# Patient Record
Sex: FEMALE | Race: WHITE
[De-identification: names, ages, dates, MRNs, and addresses within clinical notes are randomized per-mention and may not be internally consistent; named-entity substitution may affect disease eponyms.]

---

## 2019-07-31 ENCOUNTER — HOSPITAL ENCOUNTER (EMERGENCY)
Dept: HOSPITAL 74 - JER | Age: 54
Discharge: HOME | End: 2019-07-31
Payer: COMMERCIAL

## 2019-07-31 VITALS — TEMPERATURE: 97.7 F

## 2019-07-31 VITALS — SYSTOLIC BLOOD PRESSURE: 119 MMHG | DIASTOLIC BLOOD PRESSURE: 66 MMHG | HEART RATE: 76 BPM

## 2019-07-31 VITALS — BODY MASS INDEX: 26.6 KG/M2

## 2019-07-31 DIAGNOSIS — F41.9: ICD-10-CM

## 2019-07-31 DIAGNOSIS — R07.89: Primary | ICD-10-CM

## 2019-07-31 LAB
ALBUMIN SERPL-MCNC: 4.1 G/DL (ref 3.4–5)
ALP SERPL-CCNC: 102 U/L (ref 45–117)
ALT SERPL-CCNC: 17 U/L (ref 13–61)
ANION GAP SERPL CALC-SCNC: 8 MMOL/L (ref 8–16)
AST SERPL-CCNC: 12 U/L (ref 15–37)
BASOPHILS # BLD: 0.5 % (ref 0–2)
BILIRUB SERPL-MCNC: 0.9 MG/DL (ref 0.2–1)
BUN SERPL-MCNC: 13.4 MG/DL (ref 7–18)
CALCIUM SERPL-MCNC: 9.5 MG/DL (ref 8.5–10.1)
CHLORIDE SERPL-SCNC: 110 MMOL/L (ref 98–107)
CO2 SERPL-SCNC: 24 MMOL/L (ref 21–32)
CREAT SERPL-MCNC: 0.7 MG/DL (ref 0.55–1.3)
DEPRECATED RDW RBC AUTO: 13.2 % (ref 11.6–15.6)
EOSINOPHIL # BLD: 0.4 % (ref 0–4.5)
GLUCOSE SERPL-MCNC: 87 MG/DL (ref 74–106)
HCT VFR BLD CALC: 41.3 % (ref 32.4–45.2)
HGB BLD-MCNC: 14.4 GM/DL (ref 10.7–15.3)
LYMPHOCYTES # BLD: 22.6 % (ref 8–40)
MCH RBC QN AUTO: 30 PG (ref 25.7–33.7)
MCHC RBC AUTO-ENTMCNC: 35 G/DL (ref 32–36)
MCV RBC: 85.8 FL (ref 80–96)
MONOCYTES # BLD AUTO: 6.6 % (ref 3.8–10.2)
NEUTROPHILS # BLD: 69.9 % (ref 42.8–82.8)
PLATELET # BLD AUTO: 226 K/MM3 (ref 134–434)
PMV BLD: 8 FL (ref 7.5–11.1)
POTASSIUM SERPLBLD-SCNC: 3.7 MMOL/L (ref 3.5–5.1)
PROT SERPL-MCNC: 7.1 G/DL (ref 6.4–8.2)
RBC # BLD AUTO: 4.81 M/MM3 (ref 3.6–5.2)
SODIUM SERPL-SCNC: 143 MMOL/L (ref 136–145)
WBC # BLD AUTO: 5.9 K/MM3 (ref 4–10)

## 2019-07-31 NOTE — PDOC
*Physical Exam





- Vital Signs


 Last Vital Signs











Temp Pulse Resp BP Pulse Ox


 


 97.7 F   74   16   147/70   98 


 


 07/31/19 16:39  07/31/19 16:39  07/31/19 16:39  07/31/19 16:39  07/31/19 16:39














ED Treatment Course





- LABORATORY


CBC & Chemistry Diagram: 


 07/31/19 18:05





 07/31/19 18:05





- ADDITIONAL ORDERS


Additional order review: 


 Laboratory  Results











  07/31/19 07/31/19





  18:05 18:05


 


Sodium   143


 


Potassium   3.7


 


Chloride   110 H


 


Carbon Dioxide   24


 


Anion Gap   8


 


BUN   13.4


 


Creatinine   0.7


 


Est GFR (CKD-EPI)AfAm   112.26


 


Est GFR (CKD-EPI)NonAf   96.86


 


Random Glucose   87


 


Calcium   9.5


 


Magnesium  2.2 


 


Total Bilirubin   0.9


 


AST   12 L


 


ALT   17


 


Alkaline Phosphatase   102


 


Creatine Kinase   57


 


Troponin I   < 0.02


 


Total Protein   7.1


 


Albumin   4.1


 


TSH   0.37








 











  07/31/19





  18:05


 


RBC  4.81


 


MCV  85.8


 


MCHC  35.0


 


RDW  13.2


 


MPV  8.0


 


Neutrophils %  69.9


 


Lymphocytes %  22.6


 


Monocytes %  6.6


 


Eosinophils %  0.4


 


Basophils %  0.5














Medical Decision Making





- Medical Decision Making





07/31/19 19:06


Signed out from Dr Randle





54f with [facility] of anxiety presents with "heart flutters" for 1.5 week. Workup 

negative





Signed out to follow up 2nd trop at 9pm and if wnl then DC





07/31/19 22:28


2nd trop negative; waiting EKG


Signed out Dr Bangura to followup EKG and likely DC home with return pcxn








*DC/Admit/Observation/Transfer


Diagnosis at time of Disposition: 


 Chest discomfort








- Discharge Dispostion


Disposition: HOME


Condition at time of disposition: Stable





- Referrals


Referrals: 


Rodrigo Winston MD [Staff Physician] - 


Gary Almaguer MD [Primary Care Provider] - 


Don Lopez MD [Staff Physician] - 


Evan Zendejas MD [Staff Physician] - 





- Patient Instructions


Printed Discharge Instructions:  DI for Chest Pain


Additional Instructions: 


Follow up with any of the suggested cardiologists within the week. 


Come back to the emergency department for any new, worsening or concerning 

symptom. 





- Post Discharge Activity

## 2019-07-31 NOTE — PDOC
History of Present Illness





<RandleKrzysztof - Last Filed: 07/31/19 19:04>





<Hafsa Tipton - Last Filed: 07/31/19 22:17>





- General


Chief Complaint: Chest Pain


Stated Complaint: CHEST PAIN


Time Seen by Provider: 07/31/19 16:38





- History of Present Illness


Initial Comments: 





07/31/19 18:06


54F with pmh of total hysterectomy a year ago and anxiety presents to the ED 

with a week and a half of "heart flutters" not associated with chest pain, sob 

or any other symptoms. She does admit to have a lot of stressors recently due 

to mother and aunt's health and the symptom develops during those moments where 

she feels most anxious. 


She has seen a cardiologist a year ago to clear her for her hysterectomy and 

her results were completely normal. 


Last PCp visit was 3 months ago. Her thyroid was checked and was within normal 

limit. 


Also mentioned feeling gassy for the past 2 weeks at night, passing a lot of 

flatus, but unsure if connected to heart flutter feeling. 





Denies fever, chills, chest pain, sob, nausea, vomiting, diarrhea. (Krzysztof Randle)





Past History





- Past Medical History


COPD: No





- Surgical History


Cholecystectomy: No


Lung Surgery: No





- Immunization History


Immunization Up to Date: No





- Suicide/Smoking/Psychosocial Hx


Smoking History: Former smoker


Have you smoked in the past 12 months: No


Information on smoking cessation initiated: No


Hx Alcohol Use: Yes


Drug/Substance Use Hx: No





<RandleKrzysztof - Last Filed: 07/31/19 19:04>





<Hafsa Tipton - Last Filed: 07/31/19 22:17>





- Past Medical History


Allergies/Adverse Reactions: 


 Allergies











Allergy/AdvReac Type Severity Reaction Status Date / Time


 


No Known Allergies Allergy   Verified 07/31/19 16:41











Home Medications: 


Ambulatory Orders





NK [No Known Home Medication]  07/31/19 











**Review of Systems





- Review of Systems


Able to Perform ROS?: Yes


Is the patient limited English proficient: No


Constitutional: No: Symptoms Reported


HEENTM: No: Symptoms Reported


Respiratory: No: Symptoms reported


Cardiac (ROS): Yes: See HPI


ABD/GI: No: Symptoms Reported


: No: Symptoms Reported


Musculoskeletal: No: Symptoms Reported


Integumentary: No: Symptoms Reported


Neurological: No: Symptoms reported


Psychiatric: Yes: Anxiety


All Other Systems: Reviewed and Negative





<Krzysztof Randle - Last Filed: 07/31/19 19:04>





*Physical Exam





- Physical Exam


General Appearance: Yes: Nourished, Appropriately Dressed.  No: Apparent 

Distress


HEENT: positive: EOMI, PETE, Normal ENT Inspection


Respiratory/Chest: positive: Lungs Clear, Normal Breath Sounds.  negative: 

Chest Tender, Respiratory Distress


Cardiovascular: positive: Regular Rhythm, Regular Rate, S1, S2


Gastrointestinal/Abdominal: positive: Normal Bowel Sounds, Flat, Soft.  negative

: Tender


Musculoskeletal: positive: Normal Inspection.  negative: CVA Tenderness


Extremity: positive: Normal Capillary Refill, Normal Inspection, Normal Range 

of Motion


Integumentary: positive: Normal Color, Dry, Warm


Neurologic: positive: Fully Oriented, Alert, Normal Mood/Affect, Normal Response

, Motor Strength 5/5





<Krzysztof Randle - Last Filed: 07/31/19 19:04>





- Vital Signs





 Last Vital Signs











Temp Pulse Resp BP Pulse Ox


 


 97.7 F   76   18   119/66   96 


 


 07/31/19 16:39  07/31/19 19:33  07/31/19 19:33  07/31/19 19:33  07/31/19 19:33














ED Treatment Course





- LABORATORY


CBC & Chemistry Diagram: 


 07/31/19 18:05





 07/31/19 18:05





<Krzysztof Randle - Last Filed: 07/31/19 19:04>





- LABORATORY


CBC & Chemistry Diagram: 


 07/31/19 18:05





 07/31/19 18:05





<Hafsa Tipton - Last Filed: 07/31/19 22:17>





- ADDITIONAL ORDERS


Additional order review: 





 Laboratory  Results











  07/31/19 07/31/19 07/31/19





  21:30 18:05 18:05


 


Sodium    143


 


Potassium    3.7


 


Chloride    110 H


 


Carbon Dioxide    24


 


Anion Gap    8


 


BUN    13.4


 


Creatinine    0.7


 


Est GFR (CKD-EPI)AfAm    112.26


 


Est GFR (CKD-EPI)NonAf    96.86


 


Random Glucose    87


 


Calcium    9.5


 


Magnesium   2.2 


 


Total Bilirubin    0.9


 


AST    12 L


 


ALT    17


 


Alkaline Phosphatase    102


 


Creatine Kinase    57


 


Troponin I  < 0.02   < 0.02


 


Total Protein    7.1


 


Albumin    4.1


 


TSH    0.37








 











  07/31/19





  18:05


 


RBC  4.81


 


MCV  85.8


 


MCHC  35.0


 


RDW  13.2


 


MPV  8.0


 


Neutrophils %  69.9


 


Lymphocytes %  22.6


 


Monocytes %  6.6


 


Eosinophils %  0.4


 


Basophils %  0.5














- RADIOLOGY


Radiology Studies Ordered: 

















 Category Date Time Status


 


 CHEST PA & LAT [RAD] Stat Radiology  07/31/19 17:23 Taken














Medical Decision Making





<Krzysztof Randle - Last Filed: 07/31/19 19:04>





<Hafsa Tipton - Last Filed: 07/31/19 22:17>





- Medical Decision Making





07/31/19 18:25


54f with pmh of anxiety presents with "heart flutters" for 1.5 week. 


Arrythmia vs MI vs anxiety





First EKG was possibly low quality, done while patient was symptomatic, showing 

Normal sinus rhythm with occasional PVCs. 


Repeat EKG at 17:52: Normal sinus rhythm, similar rhythm strip. 





Will check troponins, cxr and TSH/basic labs. 


Patient is totally asymptomatic and comfortable. Will probably send home after 

second set of troponin with cardiology follow up. 








07/31/19 19:04


Patient signed out to Dr. Flood.  (Krzysztof Randle)





*DC/Admit/Observation/Transfer





<Krzysztof Randle - Last Filed: 07/31/19 19:04>





- Discharge Dispostion


Decision to Admit order: No





<Hafsa Tipton - Last Filed: 07/31/19 22:17>


Diagnosis at time of Disposition: 


 Chest discomfort








- Discharge Dispostion


Disposition: HOME


Condition at time of disposition: Stable





- Referrals


Referrals: 


Rodrigo Winston MD [Staff Physician] - 


Gary Almaguer MD [Primary Care Provider] - 


Don Lopez MD [Staff Physician] - 


Evan Zendejas MD [Staff Physician] - 





- Patient Instructions


Printed Discharge Instructions:  DI for Chest Pain


Additional Instructions: 


Follow up with any of the suggested cardiologists within the week. 


Come back to the emergency department for any new, worsening or concerning 

symptom. 





- Post Discharge Activity

## 2019-07-31 NOTE — PDOC
Rapid Medical Evaluation


Medical Evaluation: 





07/31/19 16:32





I have performed a brief in-person evaluation of this patient.





The patient presents with a chief complaint of: "discomfort" w/ "fluttering" in 

L chest today. Had similar sxs on and off x 1 week. Former smoker w/ no sig pmhx





Pertinent physical exam findings:Stable and well chelsey





I have ordered the following:ekg/labs





The patient will proceed to the ED for further evaluation.











**Discharge Disposition





- Diagnosis


 Chest discomfort








- Discharge Dispostion


Condition at time of disposition: Stable





- Referrals





- Patient Instructions





- Post Discharge Activity

## 2019-07-31 NOTE — PDOC
*Physical Exam





- Vital Signs


 Last Vital Signs











Temp Pulse Resp BP Pulse Ox


 


 97.7 F   76   18   119/66   96 


 


 07/31/19 16:39  07/31/19 19:33  07/31/19 19:33  07/31/19 19:33  07/31/19 19:33














- Physical Exam


Comments: 





08/01/19 06:52


Gen: Alert, NAD, comfortable-appearing.


HEENT: PERRL, EOMI, MMM, NCAT. No conjunctival pallor. Sclera are non-icteric. 

Oropharynx is clear.


CV: Regular rate and rhythm. No murmurs, rubs, or gallops.


PULM: No resp distress. CTAB, no wheezes, rales, or rhonchi.


ABD: soft, NT/ND, no rebound tenderness or guarding, no CVA tenderness.


BACK: No TTP of c/t/l-spine. No step-offs or deformities.


MSK: No bony deformities. 2+ pulses in all extremities.


NEURO: AAOx3. PERRL. No gross CN deficits. Strength and sensation grossly 

intact throughout.


EXTREMITIES: No cyanosis. No clubbing. No edema. No calf tenderness.


PSYCH: Normal mood and thought pattern.


SKIN: Warm and dry. Normal capillary refill. No rashes. No jaundice. 








**Heart Score/ECG Review





- ECG Impressions


Comment:: 





08/01/19 06:50


NSR, 75bpm, normal axis, TWI in V2, no STEs





ED Treatment Course





- LABORATORY


CBC & Chemistry Diagram: 


 07/31/19 18:05





 07/31/19 18:05





- ADDITIONAL ORDERS


Additional order review: 


 Laboratory  Results











  07/31/19 07/31/19 07/31/19





  21:30 18:05 18:05


 


Sodium    143


 


Potassium    3.7


 


Chloride    110 H


 


Carbon Dioxide    24


 


Anion Gap    8


 


BUN    13.4


 


Creatinine    0.7


 


Est GFR (CKD-EPI)AfAm    112.26


 


Est GFR (CKD-EPI)NonAf    96.86


 


Random Glucose    87


 


Calcium    9.5


 


Magnesium   2.2 


 


Total Bilirubin    0.9


 


AST    12 L


 


ALT    17


 


Alkaline Phosphatase    102


 


Creatine Kinase    57


 


Troponin I  < 0.02   < 0.02


 


Total Protein    7.1


 


Albumin    4.1


 


TSH    0.37








 











  07/31/19





  18:05


 


RBC  4.81


 


MCV  85.8


 


MCHC  35.0


 


RDW  13.2


 


MPV  8.0


 


Neutrophils %  69.9


 


Lymphocytes %  22.6


 


Monocytes %  6.6


 


Eosinophils %  0.4


 


Basophils %  0.5














Medical Decision Making





- Medical Decision Making





07/31/19 22:41


Received sign out from Dr Flood.


55yo F hx anxiety presenting with palpitations


EKG PVCs when symptomatic, normal when asymptomatic, 1st trop neg, 2nd trop neg





Pending 3nd EKG >dc if negative





Pt seen and assessed at bedside.





07/31/19 22:54


3rd EKG at 22:13: NSR, 75bpm, normal axis, no ANNA, +TWI in V2, no PVCs





Pt feels well, denies CP, palpitations, or other complaints. Dc home with 

cardiologist f/u. Return precautions given. Pt understands all dc instructions 

and all questions were answered.








*DC/Admit/Observation/Transfer


Diagnosis at time of Disposition: 


 Chest discomfort








- Discharge Dispostion


Disposition: HOME


Condition at time of disposition: Stable


Decision to Admit order: No





- Referrals


Referrals: 


Rodrigo Winston MD [Staff Physician] - 


Gary Almaguer MD [Primary Care Provider] - 


Don Lopez MD [Staff Physician] - 


Evan Zendejas MD [Staff Physician] - 





- Patient Instructions


Printed Discharge Instructions:  DI for Chest Pain


Additional Instructions: 


You have been seen in the Emergency Department for your palpitations.





Follow up with any of the suggested cardiologists within the week. 





Come back to the emergency department for any chest pain, palpitations, 

difficulty breathing, or any new, worsening or concerning symptom. 





- Post Discharge Activity

## 2019-07-31 NOTE — PDOC
Documentation entered by Sky Davila SCRIBE, acting as scribe for Hafsa Tipton MD.








Hafas Tipton MD:  This documentation has been prepared by the Giovanni rodríguez Joel, SCRIBE, under my direction and personally reviewed by me in its 

entirety.  I confirm that the documentation accurately reflects all work, 

treatment, procedures, and medical decision making performed by me.  





Attending Attestation





- Resident


Resident Name: Krzysztof Randle





- ED Attending Attestation


I have performed the following: I have examined & evaluated the patient, The 

case was reviewed & discussed with the resident, I agree w/resident's findings 

& plan





- HPI


HPI: 





07/31/19 17:50


The patient is a 54 year old female with a significant PMH of hysterectomy who 

presents to the emergency department for evaluation of intermittent chest 

discomfort with associated palpitations and fluttering for the past 1.5 

weeks. Today she had brief episodes of sharp pains in her left chest also 

described as "fluttering sensation," resolving seconds and maybe some radiation 

to her left upper shoulder/back, at 2pm.


The patient also notes increased gassy yesterday after eating a heavy meal of 

pasta and meatballs at 830pm. She acknowledges recent stressors secondary to 

taking care of her mother and aunt, and notes she has had similar episodes of 

chest discomfort associated with stress in the past.  


last echo/cards eval was preoperative planning for her [facility]RAY[facility] in 2018. 


denies increased caffeine intake, usually drinks 1 cup coffee a day


denies herbal supplements or sympathomimetic agents, usually takes vit D, 

tumeric and primrose.





Denies fever, chills, SOB, dizziness, weakness, N, V, D, abdominal pain, 

bladder and bowel problems, leg swelling, No sick contacts or travel. No new 

changes in medications.


no significant family history of CAD/MI or sudden deaths.





Allergies: None


Past Medical History: As noted above. 


Social history: Lives with family. Former smoker, ETOH use. No drug use. 


Surgical history: None reported. 


Meds: as documented in EMR


PMD: Dr. Almaguer





07/31/19 18:31





07/31/19 18:37








- Physicial Exam


PE: 





07/31/19 17:46





Agree with the resident's HPI and PE as documented in the electronic medical 

record.


NAD, well appearing, EOMI, PERRL, nl conjunctiva, anicteric; neck supple. lungs 

clear, RRR, no murmur, abdomen soft nontender. Back nontender. CONNELLY x4, no focal 

neuro deficits. No peripheral edema. normal color for ethnicity, WWP.











- Medical Decision Making





07/31/19 18:35





See HPI for details. Prior notes reviewed, including admissions, discharges and 

consultations. 


Vital signs reviewed, wnl. 


Vital Signs











Temp Pulse Resp BP Pulse Ox


 


 97.7 F   74   16   147/70   98 


 


 07/31/19 16:39  07/31/19 16:39  07/31/19 16:39  07/31/19 16:39  07/31/19 16:39





DDx chest pain: ACS, coronary vasospasm, NSTEMI, arrhythmia, unstable angina, PE

, dissection, PUD, esophageal spasm, GERD, gastritis, costochondritis, pneumonia

, pleurisy, pericarditis/myocarditis. dehydration, electrolyte/metabolic 

derangements. 


Considered but clinically doubt based on HPI and PE: dissection or PE. 





laboratory results and imaging reviewed, basic labs and lytes wnl


CXR_no acute chest pathology


Cardiac panel_neg trop x1, serials


EKG normal sinus rhythm at 67 bpm, no interval abnormalities, narrow QRS, ST 

and T wave segments and morphology normal. 


ED course


- heart score 1, low risk chest pain, estimated mace at 4-6 weeks is <1.7%


- no medical comorbidities or risk factors, no sig fam history or findings 

suggestive of CAD/angina.


serial trop/EKG, tele monitor


- chest pain free, HD appropriate and well appearing


anticipate discharge with cards and PMD followup, referrals to be given, return 

precautions and anticipatory guidance.








07/31/19 18:36





07/31/19 18:37





07/31/19 22:17





07/31/19 22:18








**Heart Score/ECG Review





- History


History: Slightly suspicious





- Electrocardiogram


EKG: Normal





- Age


Age: 45-65





- Risk Factors


Based on the list above the patient has:: No risk factors known





- Troponin


Troponin: </= normal limit





- Score


Heart Score - Total: 1


  ** #1


ECG reviewed & interpreted by me at: 17:55


General ECG Interpretation: Sinus Rhythm, Normal Rate, Normal Intervals


Compared to previous ECG there are: Previous ECG unavail





07/31/19 18:36





EKG normal sinus rhythm at 67 bpm, no interval abnormalities, narrow QRS, ST 

and T wave segments and morphology normal.

## 2019-08-01 NOTE — EKG
Test Reason : 

Blood Pressure : ***/*** mmHG

Vent. Rate : 075 BPM     Atrial Rate : 075 BPM

   P-R Int : 172 ms          QRS Dur : 082 ms

    QT Int : 382 ms       P-R-T Axes : 070 020 061 degrees

   QTc Int : 426 ms

 

NORMAL SINUS RHYTHM

POSSIBLE LEFT ATRIAL ENLARGEMENT

BORDERLINE ECG

WHEN COMPARED WITH ECG OF 31-JUL-2019 17:52,

NO SIGNIFICANT CHANGE WAS FOUND

Confirmed by ROLANDO VILLALTA MD ([id number]) on 8/1/2019 2:01:26 PM

 

Referred By:             Confirmed By:ROLANDO VILLALTA MD

## 2019-08-01 NOTE — EKG
Test Reason : 

Blood Pressure : ***/*** mmHG

Vent. Rate : 076 BPM     Atrial Rate : 076 BPM

   P-R Int : 168 ms          QRS Dur : 074 ms

    QT Int : 390 ms       P-R-T Axes : 061 012 048 degrees

   QTc Int : 438 ms

 

*** POOR DATA QUALITY, INTERPRETATION MAY BE ADVERSELY AFFECTED

SINUS RHYTHM WITH OCCASIONAL PREMATURE VENTRICULAR COMPLEXES

OTHERWISE NORMAL ECG

NO PREVIOUS ECGS AVAILABLE

Confirmed by ROLANDO VILLALTA MD ([id number]) on 8/1/2019 2:03:01 PM

 

Referred By:             Confirmed By:ROLANDO VILLALTA MD

## 2019-08-01 NOTE — EKG
Test Reason : 

Blood Pressure : ***/*** mmHG

Vent. Rate : 067 BPM     Atrial Rate : 067 BPM

   P-R Int : 168 ms          QRS Dur : 076 ms

    QT Int : 402 ms       P-R-T Axes : 060 014 036 degrees

   QTc Int : 424 ms

 

*** POOR DATA QUALITY, INTERPRETATION MAY BE ADVERSELY AFFECTED

NORMAL SINUS RHYTHM

LOW VOLTAGE QRS

BORDERLINE ECG

NO PREVIOUS ECGS AVAILABLE

Confirmed by ROLANDO VILLALTA MD ([id number]) on 8/1/2019 2:02:07 PM

 

Referred By:             Confirmed By:ROLANDO VILLALTA MD

## 2025-03-27 ENCOUNTER — HOSPITAL ENCOUNTER (OUTPATIENT)
Dept: HOSPITAL 74 - JER | Age: 60
Setting detail: OBSERVATION
LOS: 1 days | Discharge: HOME | End: 2025-03-28
Attending: INTERNAL MEDICINE | Admitting: INTERNAL MEDICINE
Payer: COMMERCIAL

## 2025-03-27 VITALS — BODY MASS INDEX: 25.4 KG/M2

## 2025-03-27 VITALS — RESPIRATION RATE: 18 BRPM

## 2025-03-27 DIAGNOSIS — R10.9: ICD-10-CM

## 2025-03-27 DIAGNOSIS — Z87.891: ICD-10-CM

## 2025-03-27 DIAGNOSIS — Z90.79: ICD-10-CM

## 2025-03-27 DIAGNOSIS — D72.829: ICD-10-CM

## 2025-03-27 DIAGNOSIS — E78.5: ICD-10-CM

## 2025-03-27 DIAGNOSIS — K56.609: Primary | ICD-10-CM

## 2025-03-27 LAB
ABSOLUTE IMMATURE GRANULOCYTES: 0.05 X10^3/UL (ref 0–0.03)
ALBUMIN SERPL-MCNC: 4.2 G/DL (ref 3.4–5)
ALP SERPL-CCNC: 89 U/L (ref 45–117)
ALT SERPL-CCNC: 22 U/L (ref 13–61)
ANION GAP SERPL CALC-SCNC: 4 MMOL/L (ref 4–13)
APPEARANCE UR: CLEAR
AST SERPL-CCNC: 40 U/L (ref 15–37)
BASOPHILS #: 0.05 X10^3/UL (ref 0.01–0.08)
BILIRUB SERPL-MCNC: 0.9 MG/DL (ref 0.2–1)
BILIRUB UR STRIP.AUTO-MCNC: NEGATIVE MG/DL
BUN SERPL-MCNC: 25.3 MG/DL (ref 7–18)
CALCIUM SERPL-MCNC: 9.8 MG/DL (ref 8.5–10.1)
CHLORIDE SERPL-SCNC: 106 MMOL/L (ref 98–107)
CO2 SERPL-SCNC: 31 MMOL/L (ref 21–32)
COLOR UR: YELLOW
CREAT SERPL-MCNC: 0.8 MG/DL (ref 0.55–1.3)
EOSINOPHIL %: 0.3 % (ref 0.7–5.8)
EOSINOPHILS #: 0.04 X10^3/UL (ref 0.04–0.36)
GLUCOSE SERPL-MCNC: 116 MG/DL (ref 74–106)
HEMATOCRIT: 43.4 % (ref 34.1–44.9)
HEMATOCRIT: 47.2 % (ref 34.1–44.9)
HEMOGLOBIN: 14.8 G/DL (ref 11.2–15.7)
HEMOGLOBIN: 16 G/DL (ref 11.2–15.7)
HIV 1+2 AB+HIV1 P24 AG SERPL QL IA: NEGATIVE
KETONES UR QL STRIP: (no result)
LEUKOCYTE ESTERASE UR QL STRIP.AUTO: NEGATIVE
MCHC: 33.9 G/DL (ref 32.2–35.5)
MCHC: 34.1 G/DL (ref 32.2–35.5)
MEAN CELL VOLUME: 86.8 FL (ref 79.4–94.8)
MEAN CELL VOLUME: 87.1 FL (ref 79.4–94.8)
MEAN PLT VOLUME: 9.5 FL (ref 9.4–12.3)
MEAN PLT VOLUME: 9.6 FL (ref 9.4–12.3)
MONOCYTE #: 0.85 X10^3/UL (ref 0.24–0.86)
MONOCYTE %: 5.6 % (ref 4.7–12.5)
NITRITE UR QL STRIP: NEGATIVE
PH UR: 5 [PH] (ref 5–8)
PLATELET COUNT: 239 X10^3/UL (ref 182–369)
PLATELET COUNT: 275 X10^3/UL (ref 182–369)
POTASSIUM SERPLBLD-SCNC: 4.8 MMOL/L (ref 3.5–5.1)
PROT SERPL-MCNC: 7.5 G/DL (ref 6.4–8.2)
PROT UR QL STRIP: NEGATIVE
PROT UR QL STRIP: NEGATIVE
RDW: 11.8 % (ref 12.4–16.4)
RDW: 12 % (ref 12.4–16.4)
SODIUM SERPL-SCNC: 140 MMOL/L (ref 136–145)
SP GR UR: 1.03 (ref 1.01–1.03)
UROBILINOGEN UR STRIP-MCNC: 0.2 MG/DL (ref 0.2–1)

## 2025-03-27 PROCEDURE — 3E023GC INTRODUCTION OF OTHER THERAPEUTIC SUBSTANCE INTO MUSCLE, PERCUTANEOUS APPROACH: ICD-10-PCS | Performed by: INTERNAL MEDICINE

## 2025-03-27 PROCEDURE — 3E033GC INTRODUCTION OF OTHER THERAPEUTIC SUBSTANCE INTO PERIPHERAL VEIN, PERCUTANEOUS APPROACH: ICD-10-PCS | Performed by: INTERNAL MEDICINE

## 2025-03-27 PROCEDURE — G0378 HOSPITAL OBSERVATION PER HR: HCPCS

## 2025-03-27 PROCEDURE — 3E033NZ INTRODUCTION OF ANALGESICS, HYPNOTICS, SEDATIVES INTO PERIPHERAL VEIN, PERCUTANEOUS APPROACH: ICD-10-PCS | Performed by: INTERNAL MEDICINE

## 2025-03-27 PROCEDURE — 3E0337Z INTRODUCTION OF ELECTROLYTIC AND WATER BALANCE SUBSTANCE INTO PERIPHERAL VEIN, PERCUTANEOUS APPROACH: ICD-10-PCS | Performed by: INTERNAL MEDICINE

## 2025-03-27 RX ADMIN — SODIUM CHLORIDE ONE ML: 9 INJECTION, SOLUTION INTRAVENOUS at 03:19

## 2025-03-27 RX ADMIN — ALUMINUM HYDROXIDE, MAGNESIUM HYDROXIDE, AND SIMETHICONE ONE ML: 200; 200; 20 SUSPENSION ORAL at 01:23

## 2025-03-27 RX ADMIN — SODIUM CHLORIDE, POTASSIUM CHLORIDE, SODIUM LACTATE AND CALCIUM CHLORIDE SCH MLS/HR: 600; 310; 30; 20 INJECTION, SOLUTION INTRAVENOUS at 11:31

## 2025-03-27 RX ADMIN — ACETAMINOPHEN PRN MG: 500 TABLET, FILM COATED ORAL at 18:27

## 2025-03-27 RX ADMIN — SIMETHICONE PRN MG: 20 SUSPENSION ORAL at 17:21

## 2025-03-27 RX ADMIN — ACETAMINOPHEN ONE MG: 10 INJECTION, SOLUTION INTRAVENOUS at 07:49

## 2025-03-27 RX ADMIN — FAMOTIDINE ONE MLS/HR: 20 INJECTION, SOLUTION INTRAVENOUS at 01:23

## 2025-03-27 RX ADMIN — ONDANSETRON ONE MG: 2 INJECTION INTRAMUSCULAR; INTRAVENOUS at 01:24

## 2025-03-27 RX ADMIN — HEPARIN SODIUM SCH UNIT: 5000 INJECTION, SOLUTION INTRAVENOUS; SUBCUTANEOUS at 21:24

## 2025-03-28 VITALS — DIASTOLIC BLOOD PRESSURE: 83 MMHG | SYSTOLIC BLOOD PRESSURE: 124 MMHG | HEART RATE: 69 BPM | TEMPERATURE: 98.1 F

## 2025-03-28 LAB
ANION GAP SERPL CALC-SCNC: 6 MMOL/L (ref 4–13)
BUN SERPL-MCNC: 13.9 MG/DL (ref 7–18)
CALCIUM SERPL-MCNC: 8.8 MG/DL (ref 8.5–10.1)
CHLORIDE SERPL-SCNC: 108 MMOL/L (ref 98–107)
CO2 SERPL-SCNC: 26 MMOL/L (ref 21–32)
CREAT SERPL-MCNC: 0.7 MG/DL (ref 0.55–1.3)
GLUCOSE SERPL-MCNC: 110 MG/DL (ref 74–106)
HEMATOCRIT: 42.2 % (ref 34.1–44.9)
HEMOGLOBIN: 14.3 G/DL (ref 11.2–15.7)
MAGNESIUM SERPL-MCNC: 2.2 MG/DL (ref 1.8–2.4)
MCHC: 33.9 G/DL (ref 32.2–35.5)
MEAN CELL VOLUME: 87.6 FL (ref 79.4–94.8)
MEAN PLT VOLUME: 9.6 FL (ref 9.4–12.3)
PHOSPHATE SERPL-MCNC: 2.9 MG/DL (ref 2.5–4.9)
PLATELET COUNT: 225 X10^3/UL (ref 182–369)
POTASSIUM SERPLBLD-SCNC: 4.1 MMOL/L (ref 3.5–5.1)
RDW: 12 % (ref 12.4–16.4)
SODIUM SERPL-SCNC: 140 MMOL/L (ref 136–145)